# Patient Record
Sex: FEMALE | Race: WHITE | Employment: OTHER | URBAN - NONMETROPOLITAN AREA
[De-identification: names, ages, dates, MRNs, and addresses within clinical notes are randomized per-mention and may not be internally consistent; named-entity substitution may affect disease eponyms.]

---

## 2023-09-21 ENCOUNTER — OUTSIDE SERVICES (OUTPATIENT)
Dept: FAMILY MEDICINE CLINIC | Age: 78
End: 2023-09-21

## 2023-09-21 DIAGNOSIS — E03.9 HYPOTHYROIDISM, UNSPECIFIED TYPE: ICD-10-CM

## 2023-09-21 DIAGNOSIS — M19.90 OSTEOARTHRITIS, UNSPECIFIED OSTEOARTHRITIS TYPE, UNSPECIFIED SITE: ICD-10-CM

## 2023-09-21 DIAGNOSIS — K75.4 AUTOIMMUNE HEPATITIS (HCC): ICD-10-CM

## 2023-09-21 DIAGNOSIS — E78.2 MIXED HYPERLIPIDEMIA: Primary | ICD-10-CM

## 2023-09-21 DIAGNOSIS — F32.A DEPRESSION, UNSPECIFIED DEPRESSION TYPE: ICD-10-CM

## 2023-09-21 NOTE — PROGRESS NOTES
9/21/2023    Christine Lind  1945    This resident is being seen today for a follow-up evaluation visit. She is a resident who has long-term medical conditions including vasculitis, hyperlipidemia, osteoarthritis, autoimmune hepatitis, depression with a surgical history of eyes, liver biopsy and breast biopsy. She is a 66 y.o. female resident who is being seen today for a follow-up evaluation visit with this resident stating that she is doing relatively well and she denies all complaints to me at this time. She states she has no pain, headaches or dizziness, sore throat, chest pain, coughing or shortness of breath, nausea or vomiting, constipation or diarrhea, fever or chills, falls or syncopal events. Medications:  Levothyroxine 75 mcg daily  Azathioprine 50 mg daily  Calcium with vitamin D daily  Atorvastatin 10 mg at bedtime      Objective     Vital Signs: /62 pulse 68 respirations 18 temperature 98.2 O2 98% weight 108.6 pounds      Physical examination:Skin is essentially warm and dry. HEENT otherwise unremarkable. Neck is supple. Heart regular rate and rhythm. No rubs, gallops or murmurs noted. Lungs are clear to auscultation. No evidence of rhonchi, rales, or wheezing. Abdomen is soft, supple and non-tender. Bowel sounds are noted x4 quadrants. No rigidity, guarding or rebound tenderness. Negative Humphrey's, negative McBurney's, negative Denny's. Extremities; no true pitting edema. Pulses are adequate. No clubbing  or no cyanosis noted. Neurologically she  is alert and oriented x3. No evidence of paralysis or paresthesias noted.     Diagnoses and all orders for this visit:    Mixed hyperlipidemia  Comments:  Maintain Atorvastatin and monitor lipid panel accordingly with cholesterol levels 180 previously 266  previously 177 with triglycerides of 48    Hypothyroidism, unspecified type  Comments:  Controlled with levothyroxine    Osteoarthritis, unspecified osteoarthritis type,

## 2024-08-13 ENCOUNTER — OFFICE VISIT (OUTPATIENT)
Dept: PRIMARY CARE CLINIC | Age: 79
End: 2024-08-13
Payer: COMMERCIAL

## 2024-08-13 VITALS
OXYGEN SATURATION: 97 % | TEMPERATURE: 97.9 F | HEIGHT: 64 IN | DIASTOLIC BLOOD PRESSURE: 68 MMHG | HEART RATE: 69 BPM | RESPIRATION RATE: 14 BRPM | BODY MASS INDEX: 18.88 KG/M2 | SYSTOLIC BLOOD PRESSURE: 100 MMHG | WEIGHT: 110.6 LBS

## 2024-08-13 DIAGNOSIS — L03.211 CELLULITIS OF FACE: ICD-10-CM

## 2024-08-13 DIAGNOSIS — H00.011 HORDEOLUM EXTERNUM OF RIGHT UPPER EYELID: Primary | ICD-10-CM

## 2024-08-13 PROCEDURE — 1123F ACP DISCUSS/DSCN MKR DOCD: CPT

## 2024-08-13 PROCEDURE — 99203 OFFICE O/P NEW LOW 30 MIN: CPT

## 2024-08-13 RX ORDER — CEPHALEXIN 500 MG/1
500 CAPSULE ORAL 2 TIMES DAILY
Qty: 14 CAPSULE | Refills: 0 | Status: SHIPPED | OUTPATIENT
Start: 2024-08-13 | End: 2024-08-20

## 2024-08-13 SDOH — ECONOMIC STABILITY: INCOME INSECURITY: HOW HARD IS IT FOR YOU TO PAY FOR THE VERY BASICS LIKE FOOD, HOUSING, MEDICAL CARE, AND HEATING?: NOT VERY HARD

## 2024-08-13 SDOH — ECONOMIC STABILITY: FOOD INSECURITY: WITHIN THE PAST 12 MONTHS, YOU WORRIED THAT YOUR FOOD WOULD RUN OUT BEFORE YOU GOT MONEY TO BUY MORE.: NEVER TRUE

## 2024-08-13 SDOH — ECONOMIC STABILITY: FOOD INSECURITY: WITHIN THE PAST 12 MONTHS, THE FOOD YOU BOUGHT JUST DIDN'T LAST AND YOU DIDN'T HAVE MONEY TO GET MORE.: NEVER TRUE

## 2024-08-13 ASSESSMENT — PATIENT HEALTH QUESTIONNAIRE - PHQ9
SUM OF ALL RESPONSES TO PHQ9 QUESTIONS 1 & 2: 1
2. FEELING DOWN, DEPRESSED OR HOPELESS: SEVERAL DAYS
SUM OF ALL RESPONSES TO PHQ QUESTIONS 1-9: 1
1. LITTLE INTEREST OR PLEASURE IN DOING THINGS: NOT AT ALL

## 2024-08-13 NOTE — PROGRESS NOTES
Chief Complaint   Stye (Right eye)      History of Present Illness   Source of history provided by: patient.      Yulissa Nguyen is a 79 y.o. old female presenting to the walk in clinic with a stye to her right eye for the past 3 days. Adds associated pain and redness to upper eyelid. Denies any drainage. Denies any pain, redness, or swelling to the eye itself. States there was no obvious FB exposure. Denies had a recent URI within the past week. Denies any visual changes, visual loss, pain with EOM, photophobia, HA, ear pain, fever, chills, generalized body aches, CP, SOB, abdominal pain, nausea, vomiting, diarrhea, rash or lethargy. Denies any contact with any individuals with known COVID-19 infection or under investigation for COVID-19 infection.      Circumstances:    []  Contact Lens Use     []  Recent URI Sx's     [x]  Spontaneous Onset     []  Close Contact w/similar Sx's     []  Work Related     History of:     []   Glaucoma     []   Recent Eye Surgery     ROS    Unless otherwise stated in this report or unable to obtain because of the patient's clinical or mental status as evidenced by the medical record, this patients's positive and negative responses for Review of Systems, constitutional, psych, eyes, ENT, cardiovascular, respiratory, gastrointestinal, neurological, genitourinary, musculoskeletal, integument systems and systems related to the presenting problem are either stated in the preceding or were not pertinent or were negative for the symptoms and/or complaints related to the medical problem.    Past Medical History:  has no past medical history on file.  Past Surgical History:  has no past surgical history on file.  Social History:  reports that she has never smoked. She has never used smokeless tobacco.  Family History: family history is not on file.   Allergies: Patient has no known allergies.    Physical Exam         VS:  /68 (Site: Right Upper Arm, Position: Sitting, Cuff Size: Medium

## 2025-01-08 ENCOUNTER — OUTSIDE SERVICES (OUTPATIENT)
Dept: PRIMARY CARE CLINIC | Age: 80
End: 2025-01-08

## 2025-01-08 DIAGNOSIS — E03.9 HYPOTHYROIDISM, UNSPECIFIED TYPE: ICD-10-CM

## 2025-01-08 DIAGNOSIS — R42 DIZZINESS: ICD-10-CM

## 2025-01-08 DIAGNOSIS — H61.22 IMPACTED CERUMEN OF LEFT EAR: ICD-10-CM

## 2025-01-08 DIAGNOSIS — E78.2 MIXED HYPERLIPIDEMIA: ICD-10-CM

## 2025-01-08 DIAGNOSIS — G30.1 MODERATE LATE ONSET ALZHEIMER'S DEMENTIA WITHOUT BEHAVIORAL DISTURBANCE, PSYCHOTIC DISTURBANCE, MOOD DISTURBANCE, OR ANXIETY (HCC): Primary | ICD-10-CM

## 2025-01-08 DIAGNOSIS — F02.B0 MODERATE LATE ONSET ALZHEIMER'S DEMENTIA WITHOUT BEHAVIORAL DISTURBANCE, PSYCHOTIC DISTURBANCE, MOOD DISTURBANCE, OR ANXIETY (HCC): Primary | ICD-10-CM

## 2025-01-09 NOTE — PROGRESS NOTES
1/8/2025    Yulissa Nguyen  1945    This resident is being seen today for an acute evaluation visit.  She is a resident who has long-term medical conditions including vasculitis, hyperlipidemia, osteoarthritis, autoimmune hepatitis, depression with a surgical history of eyes, liver biopsy and breast biopsy.   She is a 79 y.o. female resident who is being seen today for an acute evaluation visit although she was due for her follow-up.  She apparently had come to the clinic yesterday and was complaining of dizziness, although she has no recollection of this.  She states that she has had no dizziness at all.  She furthermore denies any headaches, sore throat, chest pain, coughing or shortness of breath, nausea or vomiting, constipation or diarrhea, fever or chills, falls or syncopal events.      Medications:  Levothyroxine 75 mcg daily  Calcium with vitamin D daily  Atorvastatin 10 mg at bedtime  Donepezil 5 mg at bedtime      Objective     Vital Signs: /70 pulse 70 respirations 20 temperature 97.9 O2 98% weight 111 pounds      Physical examination:Skin is essentially warm and dry. HEENT left ear with cerumen impaction but otherwise otherwise unremarkable.  Neck is supple. Heart regular rate and rhythm. No rubs, gallops or murmurs noted.  Lungs are clear to auscultation.  No evidence of rhonchi, rales, or wheezing. Abdomen is soft, supple and non-tender.  Bowel sounds are noted x4 quadrants. No rigidity, guarding or rebound tenderness.  Negative Humphrey's, negative McBurney's, negative Denny's.  Extremities; no true pitting edema.  Pulses are adequate. No clubbing  or no cyanosis noted.  Neurologically she  is alert and oriented x2 with forgetfulness.  No evidence of paralysis or paresthesias noted.    Diagnoses and all orders for this visit:    Moderate late onset Alzheimer's dementia without behavioral disturbance, psychotic disturbance, mood disturbance, or anxiety (HCC)  Comments:  Initiate donepezil 5 mg at

## 2025-02-10 ENCOUNTER — OUTSIDE SERVICES (OUTPATIENT)
Dept: PRIMARY CARE CLINIC | Age: 80
End: 2025-02-10
Payer: COMMERCIAL

## 2025-02-10 DIAGNOSIS — G30.1 MODERATE LATE ONSET ALZHEIMER'S DEMENTIA WITHOUT BEHAVIORAL DISTURBANCE, PSYCHOTIC DISTURBANCE, MOOD DISTURBANCE, OR ANXIETY (HCC): Primary | ICD-10-CM

## 2025-02-10 DIAGNOSIS — R42 DIZZINESS: ICD-10-CM

## 2025-02-10 DIAGNOSIS — E03.9 HYPOTHYROIDISM, UNSPECIFIED TYPE: ICD-10-CM

## 2025-02-10 DIAGNOSIS — E55.9 VITAMIN D DEFICIENCY: ICD-10-CM

## 2025-02-10 DIAGNOSIS — E78.2 MIXED HYPERLIPIDEMIA: ICD-10-CM

## 2025-02-10 DIAGNOSIS — F02.B0 MODERATE LATE ONSET ALZHEIMER'S DEMENTIA WITHOUT BEHAVIORAL DISTURBANCE, PSYCHOTIC DISTURBANCE, MOOD DISTURBANCE, OR ANXIETY (HCC): Primary | ICD-10-CM

## 2025-02-10 PROCEDURE — 99349 HOME/RES VST EST MOD MDM 40: CPT | Performed by: NURSE PRACTITIONER

## 2025-02-11 NOTE — PROGRESS NOTES
2/10/2025    Yulissa Nguyen  1945    This resident is being seen today for a follow-up evaluation visit.  She is a resident who has long-term medical conditions including vasculitis, hyperlipidemia, osteoarthritis, autoimmune hepatitis, depression with a surgical history of eyes, liver biopsy and breast biopsy.   She is a 79 y.o. female resident who is being seen today for a follow-up evaluation regarding medication management with a recent benefits of donepezil regarding her memory.  There is some concern given the fact that she actually does not have any recollection of some of the areas of the clinic that she has been to on multiple occasions.  I did have a discussion with her regarding her memory and she basically indicated that she would never consider moving to assisted living and if it was recommended that she would move out.  She has no concerns at this time regarding any headaches or dizziness, sore throat, chest pain, coughing or shortness of breath, nausea or vomiting, constipation or diarrhea, fever or chills, falls or syncopal events.      Medications:  Levothyroxine 75 mcg daily  Calcium with vitamin D daily  Atorvastatin 10 mg at bedtime  Donepezil 10 mg at bedtime      Objective     Vital Signs: /70 pulse 96 respirations 14 temperature 98.2 O2 100% weight 106.6 pounds (previously 111 pounds)      Physical examination:Skin is essentially warm and dry. HEENT  unremarkable.  Neck is supple. Heart regular rate and rhythm. No rubs, gallops or murmurs noted.  Lungs are clear to auscultation.  No evidence of rhonchi, rales, or wheezing. Abdomen is soft, supple and non-tender.  Bowel sounds are noted x4 quadrants. No rigidity, guarding or rebound tenderness.  Negative Humphrey's, negative McBurney's, negative Denny's.  Extremities; no true pitting edema.  Pulses are adequate. No clubbing  or no cyanosis noted.  Neurologically she  is alert and oriented x2 with forgetfulness.  No evidence of paralysis

## 2025-03-12 ENCOUNTER — OUTSIDE SERVICES (OUTPATIENT)
Dept: PRIMARY CARE CLINIC | Age: 80
End: 2025-03-12

## 2025-03-12 DIAGNOSIS — E55.9 VITAMIN D DEFICIENCY: ICD-10-CM

## 2025-03-12 DIAGNOSIS — G30.1 MODERATE LATE ONSET ALZHEIMER'S DEMENTIA WITHOUT BEHAVIORAL DISTURBANCE, PSYCHOTIC DISTURBANCE, MOOD DISTURBANCE, OR ANXIETY (HCC): ICD-10-CM

## 2025-03-12 DIAGNOSIS — R63.4 WEIGHT LOSS: ICD-10-CM

## 2025-03-12 DIAGNOSIS — E03.9 HYPOTHYROIDISM, UNSPECIFIED TYPE: ICD-10-CM

## 2025-03-12 DIAGNOSIS — F02.B0 MODERATE LATE ONSET ALZHEIMER'S DEMENTIA WITHOUT BEHAVIORAL DISTURBANCE, PSYCHOTIC DISTURBANCE, MOOD DISTURBANCE, OR ANXIETY (HCC): ICD-10-CM

## 2025-03-12 DIAGNOSIS — R26.89 BALANCE DISORDER: Primary | ICD-10-CM
